# Patient Record
Sex: MALE | Race: WHITE | NOT HISPANIC OR LATINO | Employment: FULL TIME | URBAN - METROPOLITAN AREA
[De-identification: names, ages, dates, MRNs, and addresses within clinical notes are randomized per-mention and may not be internally consistent; named-entity substitution may affect disease eponyms.]

---
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
## 2023-03-29 NOTE — PROGRESS NOTES
Chief Complaint   Irritable bowel syndrome with diarrhea      History of Present Illness       Larry Farnsworth is a 18 y.o. male who presents to [facility] for follow-up with a history of IBS diarrhea and abdominal cramps.  Patient reports that he was previously placed on amitriptyline 10 mg at night and Bentyl as needed.  Patient reports that he discontinued these medications and his abdominal cramping returned.  Patient reports a poor diet and usually consumes fast food.  He reports bowel movements are every other day with normal to loose consistency.  He reports abdominal pain 1-2 times every 2 weeks.  Patient reports Bentyl causes some dizziness.  He denies any melena or hematochezia.  He does have family history of colorectal cancer in paternal grandfather.  Patient reports that he struggled from abdominal pain since middle school.  Patient denies fever, nausea, vomiting, weight loss, night sweats, melena, hematochezia, hematemesis.    EGD: Review of the patient's most recent EGD performed by Dr. Palomo on 05.20.2021 gastritis.  Normal CT scan 2021  Negative celiac labs 2021      Results       Result Review :                             Past Medical History       Past Medical History:   Diagnosis Date   • Asthma     PEDIATRIC   • IBS (irritable bowel syndrome)        Past Surgical History:   Procedure Laterality Date   • ENDOSCOPY  2021   • TOENAIL EXCISION      Ingrown toenail         Current Outpatient Medications:   •  amitriptyline (ELAVIL) 10 MG tablet, Take 1 tablet by mouth Every Night., Disp: 30 tablet, Rfl: 5  •  famotidine (Pepcid) 20 MG tablet, Take 1 tablet by mouth every night at bedtime. (Patient not taking: Reported on 3/31/2023), Disp: 30 tablet, Rfl: 2  •  hyoscyamine (LEVSIN) 0.125 MG SL tablet, Take 1 tablet by mouth 4 (Four) Times a Day With Meals & at Bedtime., Disp: 120 tablet, Rfl: 5     Allergies   Allergen Reactions   • Amphetamine-Dextroamphetamine  "Shortness Of Breath   • Vyvanse [Lisdexamfetamine] Hallucinations       Family History   Problem Relation Age of Onset   • Colon cancer Paternal Grandfather         Social History     Social History Narrative   • Not on file       Objective     Vital Signs:   /60 (BP Location: Left arm, Patient Position: Sitting, Cuff Size: Adult)   Pulse 75   Ht 175.3 cm (69\")   Wt 56 kg (123 lb 8 oz)   SpO2 100%   BMI 18.24 kg/m²       Physical Exam  Constitutional:       General: He is not in acute distress.     Appearance: Normal appearance. He is well-developed and normal weight.   Eyes:      Conjunctiva/sclera: Conjunctivae normal.      Pupils: Pupils are equal, round, and reactive to light.      Visual Fields: Right eye visual fields normal and left eye visual fields normal.   Cardiovascular:      Rate and Rhythm: Normal rate and regular rhythm.      Heart sounds: Normal heart sounds.   Pulmonary:      Effort: Pulmonary effort is normal. No retractions.      Breath sounds: Normal breath sounds and air entry.      Comments: Inspection of chest: normal appearance  Abdominal:      General: Bowel sounds are normal.      Palpations: Abdomen is soft.      Tenderness: There is no abdominal tenderness.      Comments: No appreciable hepatosplenomegaly   Musculoskeletal:      Cervical back: Neck supple.      Right lower leg: No edema.      Left lower leg: No edema.   Lymphadenopathy:      Cervical: No cervical adenopathy.   Skin:     Findings: No lesion.      Comments: Turgor normal   Neurological:      Mental Status: He is alert and oriented to person, place, and time.   Psychiatric:         Mood and Affect: Mood and affect normal.           Assessment & Plan          Assessment and Plan    Diagnoses and all orders for this visit:    1. Irritable bowel syndrome with diarrhea (Primary)    2. Abdominal cramps    Other orders  -     hyoscyamine (LEVSIN) 0.125 MG SL tablet; Take 1 tablet by mouth 4 (Four) Times a Day With Meals " & at Bedtime.  Dispense: 120 tablet; Refill: 5  -     amitriptyline (ELAVIL) 10 MG tablet; Take 1 tablet by mouth Every Night.  Dispense: 30 tablet; Refill: 5    18-year-old male presenting the office today for follow-up with a history of IBS diarrhea and abdominal cramps.  I have refilled the patient's Elavil 10 mg nightly as this works well for his IBS abdominal pain.  I have transitioned his Bentyl to Levsin to see if this improves the side effects that he was experiencing with Bentyl.  We did discuss possible colonoscopy patient wishes to defer at this time we will reconsider at 3-month follow-up.  Patient agreeable to this plan and will call with any questions or concerns.            Follow Up       Follow Up   Return in about 3 months (around 6/30/2023).  Patient was given instructions and counseling regarding his condition or for health maintenance advice. Please see specific information pulled into the AVS if appropriate.

## 2023-03-31 ENCOUNTER — OFFICE VISIT (OUTPATIENT)
Dept: GASTROENTEROLOGY | Facility: CLINIC | Age: 18
End: 2023-03-31
Payer: OTHER GOVERNMENT

## 2023-03-31 VITALS
BODY MASS INDEX: 18.29 KG/M2 | HEART RATE: 75 BPM | WEIGHT: 123.5 LBS | SYSTOLIC BLOOD PRESSURE: 100 MMHG | HEIGHT: 69 IN | DIASTOLIC BLOOD PRESSURE: 60 MMHG | OXYGEN SATURATION: 100 %

## 2023-03-31 DIAGNOSIS — R10.9 ABDOMINAL CRAMPS: ICD-10-CM

## 2023-03-31 DIAGNOSIS — K58.0 IRRITABLE BOWEL SYNDROME WITH DIARRHEA: Primary | ICD-10-CM

## 2023-03-31 PROBLEM — J45.909 ASTHMA: Status: ACTIVE | Noted: 2023-03-31

## 2023-03-31 RX ORDER — AMITRIPTYLINE HYDROCHLORIDE 10 MG/1
10 TABLET, FILM COATED ORAL NIGHTLY
Qty: 30 TABLET | Refills: 5 | Status: SHIPPED | OUTPATIENT
Start: 2023-03-31

## 2023-04-26 ENCOUNTER — TRANSCRIBE ORDERS (OUTPATIENT)
Dept: ADMINISTRATIVE | Facility: HOSPITAL | Age: 18
End: 2023-04-26
Payer: OTHER GOVERNMENT

## 2023-04-26 DIAGNOSIS — N50.812 TESTICULAR PAIN, LEFT: Primary | ICD-10-CM

## 2023-05-10 ENCOUNTER — HOSPITAL ENCOUNTER (OUTPATIENT)
Dept: ULTRASOUND IMAGING | Facility: HOSPITAL | Age: 18
Discharge: HOME | End: 2023-05-10
Admitting: PEDIATRICS
Payer: OTHER GOVERNMENT

## 2023-05-10 DIAGNOSIS — N50.812 TESTICULAR PAIN, LEFT: ICD-10-CM

## 2023-05-10 PROCEDURE — 76870 US EXAM SCROTUM: CPT
